# Patient Record
Sex: FEMALE | Race: WHITE | NOT HISPANIC OR LATINO | Employment: OTHER | URBAN - METROPOLITAN AREA
[De-identification: names, ages, dates, MRNs, and addresses within clinical notes are randomized per-mention and may not be internally consistent; named-entity substitution may affect disease eponyms.]

---

## 2017-02-24 ENCOUNTER — TRANSCRIBE ORDERS (OUTPATIENT)
Dept: ADMINISTRATIVE | Facility: HOSPITAL | Age: 67
End: 2017-02-24

## 2017-02-24 DIAGNOSIS — Z12.31 VISIT FOR SCREENING MAMMOGRAM: Primary | ICD-10-CM

## 2017-03-14 ENCOUNTER — HOSPITAL ENCOUNTER (OUTPATIENT)
Dept: MAMMOGRAPHY | Facility: HOSPITAL | Age: 67
Discharge: HOME | End: 2017-03-14
Admitting: NURSE PRACTITIONER

## 2017-03-14 DIAGNOSIS — Z12.31 VISIT FOR SCREENING MAMMOGRAM: ICD-10-CM

## 2017-03-14 PROCEDURE — G0202 SCR MAMMO BI INCL CAD: HCPCS | Performed by: RADIOLOGY

## 2017-03-14 PROCEDURE — G0202 SCR MAMMO BI INCL CAD: HCPCS

## 2017-03-14 PROCEDURE — 77063 BREAST TOMOSYNTHESIS BI: CPT | Performed by: RADIOLOGY

## 2017-03-14 PROCEDURE — 77063 BREAST TOMOSYNTHESIS BI: CPT

## 2018-08-15 ENCOUNTER — TRANSCRIBE ORDERS (OUTPATIENT)
Dept: ADMINISTRATIVE | Facility: HOSPITAL | Age: 68
End: 2018-08-15

## 2018-08-15 DIAGNOSIS — R15.9 INCONTINENCE OF FECES, UNSPECIFIED FECAL INCONTINENCE TYPE: Primary | ICD-10-CM

## 2018-08-22 ENCOUNTER — HOSPITAL ENCOUNTER (OUTPATIENT)
Dept: CT IMAGING | Facility: HOSPITAL | Age: 68
Discharge: HOME | End: 2018-08-22
Attending: COLON & RECTAL SURGERY | Admitting: COLON & RECTAL SURGERY

## 2018-08-22 DIAGNOSIS — R15.9 INCONTINENCE OF FECES, UNSPECIFIED FECAL INCONTINENCE TYPE: ICD-10-CM

## 2018-08-22 LAB — CREAT BLDA-MCNC: 1.1 MG/DL (ref 0.6–1.3)

## 2018-08-22 PROCEDURE — 72132 CT LUMBAR SPINE W/DYE: CPT

## 2018-08-22 PROCEDURE — 82565 ASSAY OF CREATININE: CPT

## 2018-08-22 PROCEDURE — 25010000002 IOPAMIDOL 61 % SOLUTION: Performed by: COLON & RECTAL SURGERY

## 2018-08-22 RX ADMIN — IOPAMIDOL 100 ML: 612 INJECTION, SOLUTION INTRAVENOUS at 13:40

## 2018-09-18 ENCOUNTER — TRANSCRIBE ORDERS (OUTPATIENT)
Dept: ADMINISTRATIVE | Facility: HOSPITAL | Age: 68
End: 2018-09-18

## 2018-09-18 DIAGNOSIS — M48.061 SPINAL STENOSIS OF LUMBAR REGION, UNSPECIFIED WHETHER NEUROGENIC CLAUDICATION PRESENT: Primary | ICD-10-CM

## 2018-10-03 ENCOUNTER — APPOINTMENT (OUTPATIENT)
Dept: MRI IMAGING | Facility: HOSPITAL | Age: 68
End: 2018-10-03
Attending: INTERNAL MEDICINE

## 2018-10-10 ENCOUNTER — HOSPITAL ENCOUNTER (OUTPATIENT)
Dept: MRI IMAGING | Facility: HOSPITAL | Age: 68
Discharge: HOME | End: 2018-10-10
Attending: INTERNAL MEDICINE | Admitting: INTERNAL MEDICINE

## 2018-10-10 DIAGNOSIS — M48.061 SPINAL STENOSIS OF LUMBAR REGION, UNSPECIFIED WHETHER NEUROGENIC CLAUDICATION PRESENT: ICD-10-CM

## 2018-10-10 PROCEDURE — 72148 MRI LUMBAR SPINE W/O DYE: CPT

## 2019-08-05 ENCOUNTER — TRANSCRIBE ORDERS (OUTPATIENT)
Dept: LAB | Facility: HOSPITAL | Age: 69
End: 2019-08-05

## 2019-08-05 ENCOUNTER — LAB (OUTPATIENT)
Dept: LAB | Facility: HOSPITAL | Age: 69
End: 2019-08-05

## 2019-08-05 ENCOUNTER — APPOINTMENT (OUTPATIENT)
Dept: LAB | Facility: HOSPITAL | Age: 69
End: 2019-08-05

## 2019-08-05 DIAGNOSIS — Z88.1 PERSONAL HISTORY OF ALLERGY TO ANTIBIOTIC AGENT: ICD-10-CM

## 2019-08-05 DIAGNOSIS — L03.112 CELLULITIS OF LEFT AXILLA: ICD-10-CM

## 2019-08-05 DIAGNOSIS — E11.29 TYPE 2 DIABETES MELLITUS WITH MICROALBUMINURIA, WITHOUT LONG-TERM CURRENT USE OF INSULIN (HCC): Primary | ICD-10-CM

## 2019-08-05 DIAGNOSIS — Z88.0 PERSONAL HISTORY OF ALLERGY TO PENICILLIN: ICD-10-CM

## 2019-08-05 DIAGNOSIS — K04.7 PERIAPICAL ABSCESS WITHOUT SINUS: ICD-10-CM

## 2019-08-05 DIAGNOSIS — R80.9 TYPE 2 DIABETES MELLITUS WITH MICROALBUMINURIA, WITHOUT LONG-TERM CURRENT USE OF INSULIN (HCC): Primary | ICD-10-CM

## 2019-08-05 DIAGNOSIS — M27.2 ABSCESS OF JAW: Primary | ICD-10-CM

## 2019-08-05 DIAGNOSIS — M27.2 ABSCESS OF JAW: ICD-10-CM

## 2019-08-05 LAB
ALBUMIN SERPL-MCNC: 3.8 G/DL (ref 3.5–5.2)
ALBUMIN/GLOB SERPL: 1 G/DL
ALP SERPL-CCNC: 73 U/L (ref 39–117)
ALT SERPL W P-5'-P-CCNC: 13 U/L (ref 1–33)
ANION GAP SERPL CALCULATED.3IONS-SCNC: 15 MMOL/L (ref 5–15)
AST SERPL-CCNC: 25 U/L (ref 1–32)
BASOPHILS # BLD AUTO: 0.14 10*3/MM3 (ref 0–0.2)
BASOPHILS NFR BLD AUTO: 1.2 % (ref 0–1.5)
BILIRUB SERPL-MCNC: 0.3 MG/DL (ref 0.2–1.2)
BUN BLD-MCNC: 18 MG/DL (ref 8–23)
BUN/CREAT SERPL: 16.4 (ref 7–25)
CALCIUM SPEC-SCNC: 9.3 MG/DL (ref 8.6–10.5)
CHLORIDE SERPL-SCNC: 106 MMOL/L (ref 98–107)
CO2 SERPL-SCNC: 20 MMOL/L (ref 22–29)
CREAT BLD-MCNC: 1.1 MG/DL (ref 0.57–1)
CRP SERPL-MCNC: 0.06 MG/DL (ref 0–0.5)
DEPRECATED RDW RBC AUTO: 42.7 FL (ref 37–54)
EOSINOPHIL # BLD AUTO: 0.66 10*3/MM3 (ref 0–0.4)
EOSINOPHIL NFR BLD AUTO: 5.6 % (ref 0.3–6.2)
ERYTHROCYTE [DISTWIDTH] IN BLOOD BY AUTOMATED COUNT: 12.4 % (ref 12.3–15.4)
ERYTHROCYTE [SEDIMENTATION RATE] IN BLOOD: 38 MM/HR (ref 0–30)
GFR SERPL CREATININE-BSD FRML MDRD: 49 ML/MIN/1.73
GLOBULIN UR ELPH-MCNC: 3.7 GM/DL
GLUCOSE BLD-MCNC: 142 MG/DL (ref 65–99)
HCT VFR BLD AUTO: 41.8 % (ref 34–46.6)
HGB BLD-MCNC: 13.5 G/DL (ref 12–15.9)
IMM GRANULOCYTES # BLD AUTO: 0.05 10*3/MM3 (ref 0–0.05)
IMM GRANULOCYTES NFR BLD AUTO: 0.4 % (ref 0–0.5)
LYMPHOCYTES # BLD AUTO: 3.79 10*3/MM3 (ref 0.7–3.1)
LYMPHOCYTES NFR BLD AUTO: 32.1 % (ref 19.6–45.3)
MCH RBC QN AUTO: 30.5 PG (ref 26.6–33)
MCHC RBC AUTO-ENTMCNC: 32.3 G/DL (ref 31.5–35.7)
MCV RBC AUTO: 94.6 FL (ref 79–97)
MONOCYTES # BLD AUTO: 0.62 10*3/MM3 (ref 0.1–0.9)
MONOCYTES NFR BLD AUTO: 5.2 % (ref 5–12)
NEUTROPHILS # BLD AUTO: 6.55 10*3/MM3 (ref 1.7–7)
NEUTROPHILS NFR BLD AUTO: 55.5 % (ref 42.7–76)
NRBC BLD AUTO-RTO: 0 /100 WBC (ref 0–0.2)
PLATELET # BLD AUTO: 310 10*3/MM3 (ref 140–450)
PMV BLD AUTO: 10.4 FL (ref 6–12)
POTASSIUM BLD-SCNC: 3.4 MMOL/L (ref 3.5–5.2)
PROT SERPL-MCNC: 7.5 G/DL (ref 6–8.5)
RBC # BLD AUTO: 4.42 10*6/MM3 (ref 3.77–5.28)
SODIUM BLD-SCNC: 141 MMOL/L (ref 136–145)
WBC NRBC COR # BLD: 11.81 10*3/MM3 (ref 3.4–10.8)

## 2019-08-05 PROCEDURE — 86140 C-REACTIVE PROTEIN: CPT

## 2019-08-05 PROCEDURE — 36415 COLL VENOUS BLD VENIPUNCTURE: CPT

## 2019-08-05 PROCEDURE — 85025 COMPLETE CBC W/AUTO DIFF WBC: CPT

## 2019-08-05 PROCEDURE — 85652 RBC SED RATE AUTOMATED: CPT

## 2019-08-05 PROCEDURE — 83036 HEMOGLOBIN GLYCOSYLATED A1C: CPT

## 2019-08-05 PROCEDURE — 80053 COMPREHEN METABOLIC PANEL: CPT

## 2019-08-06 LAB — HBA1C MFR BLD: 7 % (ref 4.8–5.6)

## 2019-08-12 ENCOUNTER — TRANSCRIBE ORDERS (OUTPATIENT)
Dept: LAB | Facility: HOSPITAL | Age: 69
End: 2019-08-12

## 2019-08-12 ENCOUNTER — LAB (OUTPATIENT)
Dept: LAB | Facility: HOSPITAL | Age: 69
End: 2019-08-12

## 2019-08-12 DIAGNOSIS — L02.01 ABSCESS OF TEMPLE REGION: ICD-10-CM

## 2019-08-12 DIAGNOSIS — M27.2 ABSCESS OF JAW: Primary | ICD-10-CM

## 2019-08-12 DIAGNOSIS — Z88.0 PERSONAL HISTORY OF ALLERGY TO PENICILLIN: ICD-10-CM

## 2019-08-12 DIAGNOSIS — Z88.1 PERSONAL HISTORY OF ALLERGY TO ANTIBIOTIC AGENT: ICD-10-CM

## 2019-08-12 DIAGNOSIS — K04.7 PERIAPICAL ABSCESS WITHOUT SINUS: ICD-10-CM

## 2019-08-12 DIAGNOSIS — M27.2 ABSCESS OF JAW: ICD-10-CM

## 2019-08-12 LAB
ALBUMIN SERPL-MCNC: 3.7 G/DL (ref 3.5–5.2)
ALBUMIN/GLOB SERPL: 0.9 G/DL
ALP SERPL-CCNC: 76 U/L (ref 39–117)
ALT SERPL W P-5'-P-CCNC: 18 U/L (ref 1–33)
ANION GAP SERPL CALCULATED.3IONS-SCNC: 13 MMOL/L (ref 5–15)
AST SERPL-CCNC: 22 U/L (ref 1–32)
BASOPHILS # BLD AUTO: 0.11 10*3/MM3 (ref 0–0.2)
BASOPHILS NFR BLD AUTO: 0.9 % (ref 0–1.5)
BILIRUB SERPL-MCNC: 0.3 MG/DL (ref 0.2–1.2)
BUN BLD-MCNC: 17 MG/DL (ref 8–23)
BUN/CREAT SERPL: 18.3 (ref 7–25)
CALCIUM SPEC-SCNC: 9.2 MG/DL (ref 8.6–10.5)
CHLORIDE SERPL-SCNC: 108 MMOL/L (ref 98–107)
CO2 SERPL-SCNC: 20 MMOL/L (ref 22–29)
CREAT BLD-MCNC: 0.93 MG/DL (ref 0.57–1)
CRP SERPL-MCNC: 0.11 MG/DL (ref 0–0.5)
DEPRECATED RDW RBC AUTO: 44 FL (ref 37–54)
EOSINOPHIL # BLD AUTO: 0.39 10*3/MM3 (ref 0–0.4)
EOSINOPHIL NFR BLD AUTO: 3 % (ref 0.3–6.2)
ERYTHROCYTE [DISTWIDTH] IN BLOOD BY AUTOMATED COUNT: 12.5 % (ref 12.3–15.4)
ERYTHROCYTE [SEDIMENTATION RATE] IN BLOOD: 47 MM/HR (ref 0–30)
GFR SERPL CREATININE-BSD FRML MDRD: 60 ML/MIN/1.73
GLOBULIN UR ELPH-MCNC: 4 GM/DL
GLUCOSE BLD-MCNC: 155 MG/DL (ref 65–99)
HCT VFR BLD AUTO: 43.4 % (ref 34–46.6)
HGB BLD-MCNC: 14 G/DL (ref 12–15.9)
IMM GRANULOCYTES # BLD AUTO: 0.13 10*3/MM3 (ref 0–0.05)
IMM GRANULOCYTES NFR BLD AUTO: 1 % (ref 0–0.5)
LYMPHOCYTES # BLD AUTO: 2.32 10*3/MM3 (ref 0.7–3.1)
LYMPHOCYTES NFR BLD AUTO: 18.1 % (ref 19.6–45.3)
MCH RBC QN AUTO: 30.9 PG (ref 26.6–33)
MCHC RBC AUTO-ENTMCNC: 32.3 G/DL (ref 31.5–35.7)
MCV RBC AUTO: 95.8 FL (ref 79–97)
MONOCYTES # BLD AUTO: 0.69 10*3/MM3 (ref 0.1–0.9)
MONOCYTES NFR BLD AUTO: 5.4 % (ref 5–12)
NEUTROPHILS # BLD AUTO: 9.16 10*3/MM3 (ref 1.7–7)
NEUTROPHILS NFR BLD AUTO: 71.6 % (ref 42.7–76)
NRBC BLD AUTO-RTO: 0 /100 WBC (ref 0–0.2)
PLATELET # BLD AUTO: 294 10*3/MM3 (ref 140–450)
PMV BLD AUTO: 10.3 FL (ref 6–12)
POTASSIUM BLD-SCNC: 3.7 MMOL/L (ref 3.5–5.2)
PROT SERPL-MCNC: 7.7 G/DL (ref 6–8.5)
RBC # BLD AUTO: 4.53 10*6/MM3 (ref 3.77–5.28)
SODIUM BLD-SCNC: 141 MMOL/L (ref 136–145)
WBC NRBC COR # BLD: 12.8 10*3/MM3 (ref 3.4–10.8)

## 2019-08-12 PROCEDURE — 85025 COMPLETE CBC W/AUTO DIFF WBC: CPT

## 2019-08-12 PROCEDURE — 80053 COMPREHEN METABOLIC PANEL: CPT

## 2019-08-12 PROCEDURE — 86140 C-REACTIVE PROTEIN: CPT

## 2019-08-12 PROCEDURE — 36415 COLL VENOUS BLD VENIPUNCTURE: CPT

## 2019-08-12 PROCEDURE — 85652 RBC SED RATE AUTOMATED: CPT

## 2020-07-29 ENCOUNTER — HOSPITAL ENCOUNTER (OUTPATIENT)
Dept: GENERAL RADIOLOGY | Facility: HOSPITAL | Age: 70
Discharge: HOME | End: 2020-07-29
Admitting: INTERNAL MEDICINE

## 2020-07-29 ENCOUNTER — TRANSCRIBE ORDERS (OUTPATIENT)
Dept: ADMINISTRATIVE | Facility: HOSPITAL | Age: 70
End: 2020-07-29

## 2020-07-29 DIAGNOSIS — R10.9 LEFT FLANK PAIN: Primary | ICD-10-CM

## 2020-07-29 PROCEDURE — 74018 RADEX ABDOMEN 1 VIEW: CPT

## 2021-01-29 ENCOUNTER — IMMUNIZATION (OUTPATIENT)
Dept: VACCINE CLINIC | Facility: HOSPITAL | Age: 71
End: 2021-01-29

## 2021-01-29 PROCEDURE — 0001A: CPT | Performed by: INTERNAL MEDICINE

## 2021-01-29 PROCEDURE — 91300 HC SARSCOV02 VAC 30MCG/0.3ML IM: CPT | Performed by: INTERNAL MEDICINE

## 2021-02-19 ENCOUNTER — IMMUNIZATION (OUTPATIENT)
Dept: VACCINE CLINIC | Facility: HOSPITAL | Age: 71
End: 2021-02-19

## 2021-02-19 PROCEDURE — 91300 HC SARSCOV02 VAC 30MCG/0.3ML IM: CPT | Performed by: INTERNAL MEDICINE

## 2021-02-19 PROCEDURE — 0002A: CPT | Performed by: INTERNAL MEDICINE

## 2021-02-24 ENCOUNTER — CONSULT (OUTPATIENT)
Dept: ONCOLOGY | Facility: CLINIC | Age: 71
End: 2021-02-24

## 2021-02-24 VITALS
DIASTOLIC BLOOD PRESSURE: 83 MMHG | OXYGEN SATURATION: 97 % | SYSTOLIC BLOOD PRESSURE: 128 MMHG | HEIGHT: 64 IN | RESPIRATION RATE: 16 BRPM | BODY MASS INDEX: 33.48 KG/M2 | TEMPERATURE: 97.1 F | WEIGHT: 196.1 LBS | HEART RATE: 77 BPM

## 2021-02-24 DIAGNOSIS — R76.8 ELEVATED SERUM IMMUNOGLOBULIN FREE LIGHT CHAIN LEVEL: Primary | ICD-10-CM

## 2021-02-24 PROCEDURE — 99203 OFFICE O/P NEW LOW 30 MIN: CPT | Performed by: INTERNAL MEDICINE

## 2021-02-24 RX ORDER — DICYCLOMINE HYDROCHLORIDE 10 MG/1
10 CAPSULE ORAL
COMMUNITY

## 2021-02-24 RX ORDER — FEXOFENADINE HCL AND PSEUDOEPHEDRINE HCI 180; 240 MG/1; MG/1
1 TABLET, EXTENDED RELEASE ORAL DAILY
COMMUNITY

## 2021-02-24 RX ORDER — DONEPEZIL HYDROCHLORIDE 10 MG/1
10 TABLET, FILM COATED ORAL NIGHTLY
COMMUNITY

## 2021-02-24 RX ORDER — LEVOTHYROXINE SODIUM 0.07 MG/1
75 TABLET ORAL DAILY
COMMUNITY

## 2021-02-24 NOTE — PROGRESS NOTES
ID: 70 y.o. year old female from [facility] [id number]    PCP: Rajesh Siu MD    REFERRING PHYSICIAN: Rajesh Siu MD    Reason for Consultation: Elevated light chains    Dear Dr. walter    It is a pleasure to meet Ms. Blanco today.  She is a very pleasant 70-year-old lady presents today for consultation due to elevated light chains.  She is being worked up for possible chronic fatigue issues.  She was checked for Lyme disease.  She has multiple allergies.  She was noted to have elevated light chains with a ratio of 3.  Denies any other issues ongoing at this time.      Past Medical History:   Diagnosis Date   • Arthritis    • Asthma    • Colitis    • Depression    • Diabetes (CMS/HCC)    • Irritable bowel syndrome    • Rheumatic fever        No past surgical history on file.    Social History     Socioeconomic History   • Marital status:      Spouse name: Not on file   • Number of children: Not on file   • Years of education: Not on file   • Highest education level: Not on file   Tobacco Use   • Smoking status: Never Smoker   Substance and Sexual Activity   • Alcohol use: Never     Frequency: Never       Family History   Problem Relation Age of Onset   • Ovarian cancer Maternal Aunt 50   • Lung cancer Father    • Heart attack Maternal Uncle    • Heart attack Paternal Uncle    • Arthritis Maternal Grandmother    • Arthritis Paternal Grandmother    • Breast cancer Neg Hx        Review of Systems:    16 point review of systems was performed and reviewed and scanned into the EMR    Review of Systems - Oncology      Current Outpatient Medications:   •  Cholecalciferol (Vitamin D-3) 125 MCG (5000 UT) tablet, Take  by mouth., Disp: , Rfl:   •  Cyclobenzaprine HCl (FLEXERIL PO), Take  by mouth., Disp: , Rfl:   •  dicyclomine (BENTYL) 10 MG capsule, Take 10 mg by mouth 4 (Four) Times a Day Before Meals & at Bedtime., Disp: , Rfl:   •  donepezil (ARICEPT) 10 MG tablet, Take 10 mg by mouth Every Night., Disp:  , Rfl:   •  DULOXETINE HCL PO, Take  by mouth., Disp: , Rfl:   •  fexofenadine-pseudoephedrine (ALLEGRA-D 24) 180-240 MG per 24 hr tablet, Take 1 tablet by mouth Daily., Disp: , Rfl:   •  levothyroxine (Euthyrox) 75 MCG tablet, Take 75 mcg by mouth Daily., Disp: , Rfl:   •  LOPERAMIDE HCL PO, Take  by mouth., Disp: , Rfl:   •  LUTEIN PO, Take  by mouth., Disp: , Rfl:   •  LYSINE PO, Take  by mouth., Disp: , Rfl:   •  MELOXICAM PO, Take  by mouth., Disp: , Rfl:   •  MEMANTINE HCL PO, Take  by mouth., Disp: , Rfl:   •  methylcellulose oral powder, Take  by mouth Daily., Disp: , Rfl:   •  OLANZAPINE PO, Take  by mouth., Disp: , Rfl:   •  OLMESARTAN MEDOXOMIL PO, Take  by mouth., Disp: , Rfl:   •  TOPIRAMATE PO, Take  by mouth., Disp: , Rfl:     Pain Medications             Cyclobenzaprine HCl (FLEXERIL PO) Take  by mouth.    DULOXETINE HCL PO Take  by mouth.    MELOXICAM PO Take  by mouth.    OLANZAPINE PO Take  by mouth.    TOPIRAMATE PO Take  by mouth.           Allergies   Allergen Reactions   • Amoxicillin Anaphylaxis   • Penicillins Anaphylaxis   • Sulfa Antibiotics Anaphylaxis         ECOG score: 0           Objective     Vitals:    02/24/21 1314   BP: 128/83   Pulse: 77   Resp: 16   Temp: 97.1 °F (36.2 °C)   SpO2: 97%     Body mass index is 33.66 kg/m².  Body surface area is 1.94 meters squared.        02/24/21  1314   Weight: 89 kg (196 lb 1.6 oz)     Pain Score    02/24/21 1314   PainSc: 0-No pain          Physical Exam    General: well appearing, in no acute distress  HEENT: sclera anicteric,  neck is supple  Lymphatics: no cervical, supraclavicular, or axillary adenopathy  Cardiovascular: regular rate and rhythm, no murmurs, rubs or gallops  Lungs: clear to auscultation bilaterally  Abdomen: soft, nontender, nondistended.  No palpable organomegaly  Extremities: no lower extremity edema  Skin: no rashes, lesions, bruising, or petechiae  Msk:  Shows no weakness of the large muscle groups  Psych: Mood is  stable        Lab Results   Component Value Date    GLUCOSE 155 (H) 08/12/2019    BUN 17 08/12/2019    CREATININE 0.93 08/12/2019     08/12/2019    K 3.7 08/12/2019     (H) 08/12/2019    CO2 20.0 (L) 08/12/2019    CALCIUM 9.2 08/12/2019    PROTEINTOT 7.7 08/12/2019    ALBUMIN 3.70 08/12/2019    BILITOT 0.3 08/12/2019    ALKPHOS 76 08/12/2019    AST 22 08/12/2019    ALT 18 08/12/2019       Lab Results   Component Value Date    HGB 14.0 08/12/2019    HCT 43.4 08/12/2019    MCV 95.8 08/12/2019     08/12/2019    WBC 12.80 (H) 08/12/2019    NEUTROABS 9.16 (H) 08/12/2019    LYMPHSABS 2.32 08/12/2019    MONOSABS 0.69 08/12/2019    EOSABS 0.39 08/12/2019    BASOSABS 0.11 08/12/2019       No Images in the past 120 days found..      Assessment/Plan      1.  Mildly elevated light chains.  I suspect this is inflammatory secondary to her underlying issues.  I do not feel that this is a malignancy.  We will check light chains and an M spike just to make sure.  She could have monoclonal light chain process.  At this time there is no intervention that is necessary.  I will recheck it in 6 months just to be sure there is no changes.        Thank you for allowing me to participate in the care of this patient.    Yours sincerely,    Luis Johnson MD  [facility]  Hematology and Oncology    Return in (Approximately): 6 months    Orders Placed This Encounter   Procedures   • KERI, PE & Free LT Chains, Ser     Standing Status:   Future     Number of Occurrences:   1     Standing Expiration Date:   2/24/2022   • KERI, PE & Free LT Chains, Ser     Standing Status:   Future     Standing Expiration Date:   2/24/2022

## 2021-03-16 ENCOUNTER — TELEPHONE (OUTPATIENT)
Dept: ONCOLOGY | Facility: CLINIC | Age: 71
End: 2021-03-16

## 2021-03-16 NOTE — TELEPHONE ENCOUNTER
Returned call to Dr. Crawford office. Leaving a message for Mackenzie that Dr. Stratton is out of the office today and nurse will call them tomorrow with update.

## 2021-03-16 NOTE — TELEPHONE ENCOUNTER
Mackenzie from Dr. Siu's office called regarding pt. They referred her over for abnormal labs. Pt had labs drawn with us and Dr. Siu's office wants to know how Dr. Stratton felt about the new values and if any recommendations were made, how are we going to proceed from here, etc.     Please give them a call back about this at [phone]

## 2021-03-17 NOTE — TELEPHONE ENCOUNTER
Spoke to Mackenzie at Dr. Crawford. Informing her that Dr. Stratton wasn't real worried about test. Stating that he is planning on rechecking in 6 months. Mackenzie stating the office was just following up.

## 2021-08-11 ENCOUNTER — LAB (OUTPATIENT)
Dept: LAB | Facility: HOSPITAL | Age: 71
End: 2021-08-11

## 2021-08-11 DIAGNOSIS — R76.8 ELEVATED SERUM IMMUNOGLOBULIN FREE LIGHT CHAIN LEVEL: ICD-10-CM

## 2021-08-11 PROCEDURE — 84165 PROTEIN E-PHORESIS SERUM: CPT

## 2021-08-11 PROCEDURE — 86334 IMMUNOFIX E-PHORESIS SERUM: CPT

## 2021-08-11 PROCEDURE — 83883 ASSAY NEPHELOMETRY NOT SPEC: CPT

## 2021-08-11 PROCEDURE — 84155 ASSAY OF PROTEIN SERUM: CPT

## 2021-08-11 PROCEDURE — 36415 COLL VENOUS BLD VENIPUNCTURE: CPT

## 2021-08-11 PROCEDURE — 82784 ASSAY IGA/IGD/IGG/IGM EACH: CPT

## 2021-08-12 LAB
ALBUMIN SERPL ELPH-MCNC: 3.5 G/DL (ref 2.9–4.4)
ALBUMIN/GLOB SERPL: 1 {RATIO} (ref 0.7–1.7)
ALPHA1 GLOB SERPL ELPH-MCNC: 0.2 G/DL (ref 0–0.4)
ALPHA2 GLOB SERPL ELPH-MCNC: 0.8 G/DL (ref 0.4–1)
B-GLOBULIN SERPL ELPH-MCNC: 1 G/DL (ref 0.7–1.3)
GAMMA GLOB SERPL ELPH-MCNC: 1.5 G/DL (ref 0.4–1.8)
GLOBULIN SER-MCNC: 3.6 G/DL (ref 2.2–3.9)
IGA SERPL-MCNC: 428 MG/DL (ref 87–352)
IGG SERPL-MCNC: 1569 MG/DL (ref 586–1602)
IGM SERPL-MCNC: 9 MG/DL (ref 26–217)
INTERPRETATION SERPL IEP-IMP: ABNORMAL
KAPPA LC FREE SER-MCNC: 122 MG/L (ref 3.3–19.4)
KAPPA LC FREE/LAMBDA FREE SER: 3.5 {RATIO} (ref 0.26–1.65)
LABORATORY COMMENT REPORT: ABNORMAL
LAMBDA LC FREE SERPL-MCNC: 34.9 MG/L (ref 5.7–26.3)
M PROTEIN SERPL ELPH-MCNC: ABNORMAL G/DL
PROT SERPL-MCNC: 7.1 G/DL (ref 6–8.5)

## 2021-11-09 ENCOUNTER — TRANSCRIBE ORDERS (OUTPATIENT)
Dept: ADMINISTRATIVE | Facility: HOSPITAL | Age: 71
End: 2021-11-09

## 2021-11-09 DIAGNOSIS — Z12.31 ENCOUNTER FOR SCREENING MAMMOGRAM FOR MALIGNANT NEOPLASM OF BREAST: Primary | ICD-10-CM

## 2022-01-03 ENCOUNTER — TRANSCRIBE ORDERS (OUTPATIENT)
Dept: ADMINISTRATIVE | Facility: HOSPITAL | Age: 72
End: 2022-01-03

## 2022-01-03 DIAGNOSIS — F03.90 DEMENTIA WITHOUT BEHAVIORAL DISTURBANCE, UNSPECIFIED DEMENTIA TYPE: Primary | ICD-10-CM

## 2022-01-25 ENCOUNTER — HOSPITAL ENCOUNTER (OUTPATIENT)
Dept: MRI IMAGING | Facility: HOSPITAL | Age: 72
Discharge: HOME | End: 2022-01-25
Admitting: INTERNAL MEDICINE

## 2022-01-25 DIAGNOSIS — F03.90 DEMENTIA WITHOUT BEHAVIORAL DISTURBANCE, UNSPECIFIED DEMENTIA TYPE: ICD-10-CM

## 2022-01-25 PROCEDURE — 70551 MRI BRAIN STEM W/O DYE: CPT

## 2022-02-10 ENCOUNTER — LAB (OUTPATIENT)
Dept: LAB | Facility: HOSPITAL | Age: 72
End: 2022-02-10

## 2022-02-10 ENCOUNTER — TRANSCRIBE ORDERS (OUTPATIENT)
Dept: ADMINISTRATIVE | Facility: HOSPITAL | Age: 72
End: 2022-02-10

## 2022-02-10 ENCOUNTER — TRANSCRIBE ORDERS (OUTPATIENT)
Dept: LAB | Facility: HOSPITAL | Age: 72
End: 2022-02-10

## 2022-02-10 DIAGNOSIS — N39.0 URINARY TRACT INFECTION WITHOUT HEMATURIA, SITE UNSPECIFIED: ICD-10-CM

## 2022-02-10 DIAGNOSIS — N30.00 ACUTE CYSTITIS WITHOUT HEMATURIA: Primary | ICD-10-CM

## 2022-02-10 DIAGNOSIS — Z88.0 PERSONAL HISTORY OF ALLERGY TO PENICILLIN: ICD-10-CM

## 2022-02-10 DIAGNOSIS — N39.0 RECURRENT UTI: Primary | ICD-10-CM

## 2022-02-10 DIAGNOSIS — N30.00 ACUTE CYSTITIS WITHOUT HEMATURIA: ICD-10-CM

## 2022-02-10 LAB
BACTERIA UR QL AUTO: ABNORMAL /HPF
BILIRUB UR QL STRIP: NEGATIVE
CLARITY UR: CLEAR
COD CRY URNS QL: ABNORMAL /HPF
COLOR UR: YELLOW
GLUCOSE UR STRIP-MCNC: NEGATIVE MG/DL
HGB UR QL STRIP.AUTO: NEGATIVE
HYALINE CASTS UR QL AUTO: ABNORMAL /LPF
KETONES UR QL STRIP: NEGATIVE
LEUKOCYTE ESTERASE UR QL STRIP.AUTO: ABNORMAL
NITRITE UR QL STRIP: NEGATIVE
PH UR STRIP.AUTO: <=5 [PH] (ref 5–8)
PROT UR QL STRIP: NEGATIVE
RBC # UR STRIP: ABNORMAL /HPF
REF LAB TEST METHOD: ABNORMAL
SP GR UR STRIP: 1.02 (ref 1–1.03)
SQUAMOUS #/AREA URNS HPF: ABNORMAL /HPF
UROBILINOGEN UR QL STRIP: ABNORMAL
WBC # UR STRIP: ABNORMAL /HPF

## 2022-02-10 PROCEDURE — 81001 URINALYSIS AUTO W/SCOPE: CPT

## 2022-02-10 PROCEDURE — 87086 URINE CULTURE/COLONY COUNT: CPT

## 2022-02-11 LAB — BACTERIA SPEC AEROBE CULT: NO GROWTH

## 2022-02-15 ENCOUNTER — HOSPITAL ENCOUNTER (OUTPATIENT)
Dept: ULTRASOUND IMAGING | Facility: HOSPITAL | Age: 72
Discharge: HOME | End: 2022-02-15
Admitting: INTERNAL MEDICINE

## 2022-02-15 DIAGNOSIS — N39.0 RECURRENT UTI: ICD-10-CM

## 2022-02-15 PROCEDURE — 76775 US EXAM ABDO BACK WALL LIM: CPT

## 2022-03-03 ENCOUNTER — TRANSCRIBE ORDERS (OUTPATIENT)
Dept: ADMINISTRATIVE | Facility: HOSPITAL | Age: 72
End: 2022-03-03

## 2022-03-03 DIAGNOSIS — N39.0 URINARY TRACT INFECTION WITHOUT HEMATURIA, SITE UNSPECIFIED: Primary | ICD-10-CM

## 2022-03-10 ENCOUNTER — HOSPITAL ENCOUNTER (OUTPATIENT)
Dept: CT IMAGING | Facility: HOSPITAL | Age: 72
Discharge: HOME | End: 2022-03-10
Admitting: INTERNAL MEDICINE

## 2022-03-10 DIAGNOSIS — N39.0 URINARY TRACT INFECTION WITHOUT HEMATURIA, SITE UNSPECIFIED: ICD-10-CM

## 2022-03-10 LAB — CREAT BLDA-MCNC: 1.1 MG/DL (ref 0.6–1.3)

## 2022-03-10 PROCEDURE — 74177 CT ABD & PELVIS W/CONTRAST: CPT

## 2022-03-10 PROCEDURE — 25010000002 IOPAMIDOL 61 % SOLUTION: Performed by: INTERNAL MEDICINE

## 2022-03-10 PROCEDURE — 82565 ASSAY OF CREATININE: CPT

## 2022-03-10 RX ADMIN — IOPAMIDOL 98 ML: 612 INJECTION, SOLUTION INTRAVENOUS at 10:17

## 2022-11-29 ENCOUNTER — TRANSCRIBE ORDERS (OUTPATIENT)
Dept: ADMINISTRATIVE | Facility: HOSPITAL | Age: 72
End: 2022-11-29

## 2022-11-29 DIAGNOSIS — Z12.31 ENCOUNTER FOR SCREENING MAMMOGRAM FOR MALIGNANT NEOPLASM OF BREAST: Primary | ICD-10-CM

## 2023-01-10 ENCOUNTER — APPOINTMENT (OUTPATIENT)
Dept: MAMMOGRAPHY | Facility: HOSPITAL | Age: 73
End: 2023-01-10
Payer: MEDICARE

## 2023-02-02 ENCOUNTER — HOSPITAL ENCOUNTER (OUTPATIENT)
Dept: MAMMOGRAPHY | Facility: HOSPITAL | Age: 73
Discharge: HOME | End: 2023-02-02
Admitting: INTERNAL MEDICINE
Payer: MEDICARE

## 2023-02-02 DIAGNOSIS — Z12.31 ENCOUNTER FOR SCREENING MAMMOGRAM FOR MALIGNANT NEOPLASM OF BREAST: ICD-10-CM

## 2023-02-02 PROCEDURE — 77067 SCR MAMMO BI INCL CAD: CPT

## 2023-02-02 PROCEDURE — 77063 BREAST TOMOSYNTHESIS BI: CPT

## 2023-02-02 PROCEDURE — 77063 BREAST TOMOSYNTHESIS BI: CPT | Performed by: RADIOLOGY

## 2023-02-02 PROCEDURE — 77067 SCR MAMMO BI INCL CAD: CPT | Performed by: RADIOLOGY

## 2024-05-09 ENCOUNTER — HOME HEALTH ADMISSION (OUTPATIENT)
Dept: HOME HEALTH SERVICES | Facility: HOME HEALTHCARE | Age: 74
End: 2024-05-09
Payer: MEDICARE

## 2024-05-09 ENCOUNTER — TRANSCRIBE ORDERS (OUTPATIENT)
Dept: HOME HEALTH SERVICES | Facility: HOME HEALTHCARE | Age: 74
End: 2024-05-09
Payer: MEDICARE

## 2024-05-09 DIAGNOSIS — M54.12 RADICULOPATHY OF CERVICAL SPINE: Primary | ICD-10-CM
